# Patient Record
Sex: MALE | Race: WHITE | NOT HISPANIC OR LATINO | URBAN - METROPOLITAN AREA
[De-identification: names, ages, dates, MRNs, and addresses within clinical notes are randomized per-mention and may not be internally consistent; named-entity substitution may affect disease eponyms.]

---

## 2021-06-11 ENCOUNTER — TELEPHONE ENCOUNTER (OUTPATIENT)
Dept: URBAN - METROPOLITAN AREA CLINIC 92 | Facility: CLINIC | Age: 76
End: 2021-06-11

## 2021-06-11 ENCOUNTER — OFFICE VISIT (OUTPATIENT)
Dept: URBAN - METROPOLITAN AREA CLINIC 118 | Facility: CLINIC | Age: 76
End: 2021-06-11
Payer: MEDICARE

## 2021-06-11 ENCOUNTER — DASHBOARD ENCOUNTERS (OUTPATIENT)
Age: 76
End: 2021-06-11

## 2021-06-11 DIAGNOSIS — K21.9 GASTROESOPHAGEAL REFLUX DISEASE WITHOUT ESOPHAGITIS: ICD-10-CM

## 2021-06-11 DIAGNOSIS — R19.4 CHANGE IN BOWEL HABITS: ICD-10-CM

## 2021-06-11 DIAGNOSIS — R13.10 DYSPHAGIA, UNSPECIFIED TYPE: ICD-10-CM

## 2021-06-11 DIAGNOSIS — R15.9 INCONTINENCE OF FECES, UNSPECIFIED FECAL INCONTINENCE TYPE: ICD-10-CM

## 2021-06-11 PROBLEM — 40739000: Status: ACTIVE | Noted: 2021-06-11

## 2021-06-11 PROBLEM — 266435005: Status: ACTIVE | Noted: 2021-06-11

## 2021-06-11 PROBLEM — 72042002: Status: ACTIVE | Noted: 2021-06-11

## 2021-06-11 PROCEDURE — 99204 OFFICE O/P NEW MOD 45 MIN: CPT | Performed by: INTERNAL MEDICINE

## 2021-06-11 NOTE — HPI-TODAY'S VISIT:
pt h/o gerd on Omeprazole presents for follow up. Pt reports concern for continuing to take med due to recent med group concern. Reports reflux stable but worsens when off meds. pt has tried Pepcid and other [id number] with reported side effects and unable to tolerate drug. Denies other UGI symptoms at this time. pt also reports recent fecal incontinence now improved as well as bowel changes. Noted decrease bm for few days followed by hard bm with looseness at the end. Denies alarm symptoms. Reports benign colonoscopy 1 year ago w/ polyps